# Patient Record
Sex: FEMALE | Race: WHITE
[De-identification: names, ages, dates, MRNs, and addresses within clinical notes are randomized per-mention and may not be internally consistent; named-entity substitution may affect disease eponyms.]

---

## 2019-08-30 ENCOUNTER — HOSPITAL ENCOUNTER (OUTPATIENT)
Dept: HOSPITAL 47 - RADMAMWWP | Age: 54
Discharge: HOME | End: 2019-08-30
Attending: FAMILY MEDICINE
Payer: COMMERCIAL

## 2019-08-30 DIAGNOSIS — Z12.31: Primary | ICD-10-CM

## 2019-08-30 PROCEDURE — 77067 SCR MAMMO BI INCL CAD: CPT

## 2019-09-04 NOTE — MM
Reason for exam: screening  (asymptomatic).

Last mammogram was performed 1 year and 3 months ago.



Physical Findings:

A clinical breast exam by your physician is recommended on an annual basis and 

results should be correlated with mammographic findings.



MG Screening Mammo w CAD

Bilateral CC and MLO view(s) were taken.

Prior study comparison: June 6, 2018, bilateral MG screening mammo w CAD.

The breast tissue is heterogeneously dense. This may lower the sensitivity of 

mammography.  There is chronic nodularity in the right breast.  No significant 

changes when compared with prior studies.





ASSESSMENT: Benign, BI-RAD 2



RECOMMENDATION:

Routine screening mammogram of both breasts in 1 year.

## 2020-10-12 ENCOUNTER — HOSPITAL ENCOUNTER (OUTPATIENT)
Dept: HOSPITAL 47 - RADMAMWWP | Age: 55
Discharge: HOME | End: 2020-10-12
Attending: FAMILY MEDICINE
Payer: COMMERCIAL

## 2020-10-12 DIAGNOSIS — Z12.31: Primary | ICD-10-CM

## 2020-10-12 PROCEDURE — 77067 SCR MAMMO BI INCL CAD: CPT

## 2020-10-14 NOTE — MM
Reason for exam: screening  (asymptomatic).

Last mammogram was performed 1 year and 1 month ago.



History:

Patient is postmenopausal.



Physical Findings:

A clinical breast exam by your physician is recommended on an annual basis and 

results should be correlated with mammographic findings.



MG Screening Mammo w CAD

Bilateral CC and MLO view(s) were taken.

Prior study comparison: August 30, 2019, bilateral MG screening mammo w CAD.  June 6, 2018, bilateral MG screening mammo w CAD.

The breast tissue is heterogeneously dense. This may lower the sensitivity of 

mammography.  There is no discrete abnormality.  No significant changes when 

compared with prior studies.





ASSESSMENT: Benign, BI-RAD 2



RECOMMENDATION:

Routine screening mammogram of both breasts in 1 year.

## 2021-10-22 ENCOUNTER — HOSPITAL ENCOUNTER (OUTPATIENT)
Dept: HOSPITAL 47 - RADMAMWWP | Age: 56
Discharge: HOME | End: 2021-10-22
Attending: FAMILY MEDICINE
Payer: COMMERCIAL

## 2021-10-22 DIAGNOSIS — Z12.31: Primary | ICD-10-CM

## 2021-10-22 PROCEDURE — 77067 SCR MAMMO BI INCL CAD: CPT

## 2021-10-25 NOTE — MM
Reason for exam: screening  (asymptomatic).

Last mammogram was performed 1 year ago.



History:

Patient is postmenopausal.

Took hormonal contraceptives for 25 years.



Physical Findings:

A clinical breast exam by your physician is recommended on an annual basis and 

results should be correlated with mammographic findings.



MG Screening Mammo w CAD

Bilateral CC and MLO view(s) were taken.

Prior study comparison: October 12, 2020, bilateral MG screening mammo w CAD.  

August 30, 2019, bilateral MG screening mammo w CAD.

The breast tissue is heterogeneously dense. This may lower the sensitivity of 

mammography.  There is no discrete abnormality.  No significant changes when 

compared with prior studies.





ASSESSMENT: Negative, BI-RAD 1



RECOMMENDATION:

Routine screening mammogram of both breasts in 1 year.

## 2022-05-01 ENCOUNTER — HOSPITAL ENCOUNTER (EMERGENCY)
Dept: HOSPITAL 47 - EC | Age: 57
Discharge: HOME | End: 2022-05-01
Payer: COMMERCIAL

## 2022-05-01 VITALS — SYSTOLIC BLOOD PRESSURE: 110 MMHG | DIASTOLIC BLOOD PRESSURE: 84 MMHG | HEART RATE: 98 BPM

## 2022-05-01 VITALS — RESPIRATION RATE: 18 BRPM | TEMPERATURE: 97.8 F

## 2022-05-01 DIAGNOSIS — U07.1: Primary | ICD-10-CM

## 2022-05-01 DIAGNOSIS — E83.42: ICD-10-CM

## 2022-05-01 DIAGNOSIS — R55: ICD-10-CM

## 2022-05-01 DIAGNOSIS — Z87.891: ICD-10-CM

## 2022-05-01 LAB
ALBUMIN SERPL-MCNC: 3.8 G/DL (ref 3.5–5)
ALP SERPL-CCNC: 48 U/L (ref 38–126)
ALT SERPL-CCNC: 20 U/L (ref 4–34)
ANION GAP SERPL CALC-SCNC: 9 MMOL/L
AST SERPL-CCNC: 30 U/L (ref 14–36)
BASOPHILS # BLD AUTO: 0.1 K/UL (ref 0–0.2)
BASOPHILS NFR BLD AUTO: 1 %
BUN SERPL-SCNC: 17 MG/DL (ref 7–17)
CALCIUM SPEC-MCNC: 8.4 MG/DL (ref 8.4–10.2)
CHLORIDE SERPL-SCNC: 105 MMOL/L (ref 98–107)
CO2 SERPL-SCNC: 22 MMOL/L (ref 22–30)
EOSINOPHIL # BLD AUTO: 0.1 K/UL (ref 0–0.7)
EOSINOPHIL NFR BLD AUTO: 2 %
ERYTHROCYTE [DISTWIDTH] IN BLOOD BY AUTOMATED COUNT: 4.18 M/UL (ref 3.8–5.4)
ERYTHROCYTE [DISTWIDTH] IN BLOOD: 12.6 % (ref 11.5–15.5)
GLUCOSE SERPL-MCNC: 102 MG/DL (ref 74–99)
HCT VFR BLD AUTO: 39.2 % (ref 34–46)
HGB BLD-MCNC: 12.5 GM/DL (ref 11.4–16)
HYALINE CASTS UR QL AUTO: 1 /LPF (ref 0–2)
KETONES UR QL STRIP.AUTO: (no result)
LYMPHOCYTES # SPEC AUTO: 0.5 K/UL (ref 1–4.8)
LYMPHOCYTES NFR SPEC AUTO: 9 %
MAGNESIUM SPEC-SCNC: 1.5 MG/DL (ref 1.6–2.3)
MCH RBC QN AUTO: 29.9 PG (ref 25–35)
MCHC RBC AUTO-ENTMCNC: 31.9 G/DL (ref 31–37)
MCV RBC AUTO: 93.7 FL (ref 80–100)
MONOCYTES # BLD AUTO: 0.2 K/UL (ref 0–1)
MONOCYTES NFR BLD AUTO: 4 %
NEUTROPHILS # BLD AUTO: 4.3 K/UL (ref 1.3–7.7)
NEUTROPHILS NFR BLD AUTO: 83 %
PH UR: 5.5 [PH] (ref 5–8)
PLATELET # BLD AUTO: 177 K/UL (ref 150–450)
POTASSIUM SERPL-SCNC: 4.1 MMOL/L (ref 3.5–5.1)
PROT SERPL-MCNC: 7 G/DL (ref 6.3–8.2)
RBC UR QL: 2 /HPF (ref 0–5)
SODIUM SERPL-SCNC: 136 MMOL/L (ref 137–145)
SP GR UR: 1.02 (ref 1–1.03)
SQUAMOUS UR QL AUTO: 1 /HPF (ref 0–4)
UROBILINOGEN UR QL STRIP: <2 MG/DL (ref ?–2)
WBC # BLD AUTO: 5.2 K/UL (ref 3.8–10.6)
WBC # UR AUTO: 3 /HPF (ref 0–5)

## 2022-05-01 PROCEDURE — 87502 INFLUENZA DNA AMP PROBE: CPT

## 2022-05-01 PROCEDURE — 83735 ASSAY OF MAGNESIUM: CPT

## 2022-05-01 PROCEDURE — 85379 FIBRIN DEGRADATION QUANT: CPT

## 2022-05-01 PROCEDURE — 81001 URINALYSIS AUTO W/SCOPE: CPT

## 2022-05-01 PROCEDURE — 85025 COMPLETE CBC W/AUTO DIFF WBC: CPT

## 2022-05-01 PROCEDURE — 36415 COLL VENOUS BLD VENIPUNCTURE: CPT

## 2022-05-01 PROCEDURE — 71275 CT ANGIOGRAPHY CHEST: CPT

## 2022-05-01 PROCEDURE — 80053 COMPREHEN METABOLIC PANEL: CPT

## 2022-05-01 PROCEDURE — 87635 SARS-COV-2 COVID-19 AMP PRB: CPT

## 2022-05-01 PROCEDURE — 71046 X-RAY EXAM CHEST 2 VIEWS: CPT

## 2022-05-01 PROCEDURE — 84484 ASSAY OF TROPONIN QUANT: CPT

## 2022-05-01 PROCEDURE — 93005 ELECTROCARDIOGRAM TRACING: CPT

## 2022-05-01 PROCEDURE — 99284 EMERGENCY DEPT VISIT MOD MDM: CPT

## 2022-05-01 NOTE — ED
General Adult HPI





- General


Chief complaint: Fever


Stated complaint: Fever,Nausea


Time Seen by Provider: 05/01/22 17:03


Source: patient


Mode of arrival: wheelchair


Limitations: no limitations





- History of Present Illness


Initial comments: 





This is a pleasant 57-year-old female with no significant past medical history. 

She presents to the emergency department complaining of fatigue, body aches, 

nasal congestion, mild nausea and mild headache since Friday.  Patient believes 

she also had a fever.  Patient denying any vomiting or diarrhea.  No chest pain 

or shortness of breath.  No abdominal pain.  No problems with urination.  

Patient states she's been trying to stay hydrated.  Also complaining of a cough 

and mild shortness of breath





 no changes in vision or hearing, no sore throat or difficulty with speech, no 

neck pain, no chest pain, no abdominal pain, no vomiting, no changes in 

urination or bowel movements, no numbness or tingling, no extremity pain, no 

skin rashes or lesions.





Of note, patient states that she also passed out earlier today.   thought

he heard something from the room and found her leg [facility] on the bed and on the

floor.  Patient states she had went to the bathroom was 20 back to the room b

ecame lightheaded.  Patient unsure whether she completely lost consciousness.  

She denies any injury during this fall.  No head or neck pain.  No vertigo.  No 

focal weakness.





- Related Data


                                Home Medications











 Medication  Instructions  Recorded  Confirmed


 


Aspirin [Adult Low Dose Aspirin EC] 81 mg PO DAILY 05/31/18 05/31/18


 


Norgestimate-Ethinyl Estradiol 1 each PO DAILY 05/31/18 05/31/18





[Sprintec 28 Day Tablet]   








                                  Previous Rx's











 Medication  Instructions  Recorded


 


Acetaminophen [Tylenol] 500 mg PO Q4-6H PRN #24 tab 05/01/22


 


Ibuprofen [Motrin Ib] 400 mg PO Q8H PRN #50 tab 05/01/22


 


Magnesium Oxide 400 mg PO DAILY #10 tab 05/01/22











                                    Allergies











Allergy/AdvReac Type Severity Reaction Status Date / Time


 


No Known Allergies Allergy   Verified 05/01/22 16:30














Review of Systems


ROS Statement: 


Those systems with pertinent positive or pertinent negative responses have been 

documented in the HPI.





ROS Other: All systems not noted in ROS Statement are negative.





Past Medical History


Past Medical History: No Reported History


Additional Past Medical History / Comment(s): SCREENING


History of Any Multi-Drug Resistant Organisms: None Reported


Past Surgical History: No Surgical Hx Reported


Past Anesthesia/Blood Transfusion Reactions: Motion Sickness


Additional Past Anesthesia/Blood Transfusion Reaction / Comment(s): NO PRIOR SX 

HX


Past Psychological History: No Psychological Hx Reported


Smoking Status: Former smoker


Past Alcohol Use History: None Reported


Past Drug Use History: None Reported





- Past Family History


  ** Father


Family Medical History: Cancer





General Exam





- General Exam Comments


Initial Comments: 





She appears mildly ill but not toxic.  Cranial nerves II through XII are intact.


Limitations: no limitations


General appearance: alert, in no apparent distress


Head exam: Present: atraumatic, normocephalic, normal inspection


Eye exam: Present: normal appearance, PERRL, EOMI.  Absent: scleral icterus, 

conjunctival injection, periorbital swelling


ENT exam: Present: normal exam, normal oropharynx, mucous membranes moist, TM's 

normal bilaterally, normal external ear exam.  Absent: mucous membranes dry


Neck exam: Present: normal inspection, full ROM.  Absent: tenderness, 

meningismus, lymphadenopathy


Respiratory exam: Present: normal lung sounds bilaterally.  Absent: respiratory 

distress, wheezes, rales, rhonchi, stridor, chest wall tenderness, accessory 

muscle use, decreased breath sounds, prolonged expiratory


Cardiovascular Exam: Present: regular rate, normal rhythm, normal heart sounds. 

Absent: systolic murmur, diastolic murmur, rubs, gallop, clicks


GI/Abdominal exam: Present: soft, normal bowel sounds.  Absent: distended, 

tenderness, guarding, rebound, rigid


Extremities exam: Present: normal inspection, full ROM, normal capillary refill.

 Absent: tenderness, pedal edema, joint swelling, calf tenderness


Back exam: Present: normal inspection


Neurological exam: Present: alert, oriented X3, CN II-XII intact, normal gait.  

Absent: altered, abnormal gait


Psychiatric exam: Present: normal affect, normal mood.  Absent: depressed, 

agitated, anxious, flat affect, manic, homicidal ideation


Skin exam: Present: warm, dry, intact, normal color.  Absent: rash, cyanosis, 

diaphoretic, erythema, urticaria, vesicles





Course


                                   Vital Signs











  05/01/22





  16:26


 


Temperature 97.8 F


 


Pulse Rate 70


 


Respiratory 18





Rate 


 


Blood Pressure 126/74


 


O2 Sat by Pulse 99





Oximetry 














- Reevaluation(s)


Reevaluation #1: 





05/01/22 21:06


Patient reevaluated several times with a course of stay.  Patient much improved 

after treatment here in the ER.  COVID-19 positive.  Patient remained h

emodynamically stable.





EKG Findings





- EKG Comments:


EKG Findings:: EKG done at 1817 and read by the ED attending physician reveals 

sinus rhythm with the rate is 69.  Normal intervals.  Evidence of an incomplete 

right bundle-branch block with RSR pattern.  No evidence of acute changes.  

Normal axis.





Medical Decision Making





- Medical Decision Making





Patient has shortness of breath, had a syncopal episode or near syncopal episode

at home.  PERC is 1





Patient's [place] syncope score is very low based on her central pathology.  

This sounds like it was more of a near syncopal episode.  There was no seizure 

activity.  Patient positive for COVID-19.





Quarantine measures discussed in detail.  Treatment plan discussed.  Patient 

does not meet criteria for monoclonal antibody.  Antipyretic therapy discussed 

hydration strategies discussed.





Minimally low magnesium.  Supplemented here.  We'll supplement for an additional

5 days.





Patient was told to return to the ER for any signs or symptoms worsen.  Told to 

return immediately if any other problems arise.  All questions answered.  

Treatment plan discussed.  Patient in agreement


Every effort has been made to ensure accuracy of this dictation.  However, due 

to the limitations of electronic medical records and dictation devices, errors 

in charting still occur.





Supervising physician is Dr. Malave





- Lab Data


Result diagrams: 


                                 05/01/22 20:27





                                 05/01/22 20:27


                                   Lab Results











  05/01/22 05/01/22 05/01/22 Range/Units





  16:38 16:38 19:03 


 


WBC     (3.8-10.6)  k/uL


 


RBC     (3.80-5.40)  m/uL


 


Hgb     (11.4-16.0)  gm/dL


 


Hct     (34.0-46.0)  %


 


MCV     (80.0-100.0)  fL


 


MCH     (25.0-35.0)  pg


 


MCHC     (31.0-37.0)  g/dL


 


RDW     (11.5-15.5)  %


 


Plt Count     (150-450)  k/uL


 


MPV     


 


Neutrophils %     %


 


Lymphocytes %     %


 


Monocytes %     %


 


Eosinophils %     %


 


Basophils %     %


 


Neutrophils #     (1.3-7.7)  k/uL


 


Lymphocytes #     (1.0-4.8)  k/uL


 


Monocytes #     (0-1.0)  k/uL


 


Eosinophils #     (0-0.7)  k/uL


 


Basophils #     (0-0.2)  k/uL


 


Sodium     (137-145)  mmol/L


 


Potassium     (3.5-5.1)  mmol/L


 


Chloride     ()  mmol/L


 


Carbon Dioxide     (22-30)  mmol/L


 


Anion Gap     mmol/L


 


BUN     (7-17)  mg/dL


 


Creatinine     (0.52-1.04)  mg/dL


 


Est GFR (CKD-EPI)AfAm     (>60 ml/min/1.73 sqM)  


 


Est GFR (CKD-EPI)NonAf     (>60 ml/min/1.73 sqM)  


 


Glucose     (74-99)  mg/dL


 


Calcium     (8.4-10.2)  mg/dL


 


Magnesium     (1.6-2.3)  mg/dL


 


Total Bilirubin     (0.2-1.3)  mg/dL


 


AST     (14-36)  U/L


 


ALT     (4-34)  U/L


 


Alkaline Phosphatase     ()  U/L


 


Total Protein     (6.3-8.2)  g/dL


 


Albumin     (3.5-5.0)  g/dL


 


Urine Color    Yellow  


 


Urine Appearance    Cloudy H  (Clear)  


 


Urine pH    5.5  (5.0-8.0)  


 


Ur Specific Gravity    1.023  (1.001-1.035)  


 


Urine Protein    Trace H  (Negative)  


 


Urine Glucose (UA)    Negative  (Negative)  


 


Urine Ketones    1+ H  (Negative)  


 


Urine Blood    Negative  (Negative)  


 


Urine Nitrite    Negative  (Negative)  


 


Urine Bilirubin    Negative  (Negative)  


 


Urine Urobilinogen    <2.0  (<2.0)  mg/dL


 


Ur Leukocyte Esterase    Negative  (Negative)  


 


Urine RBC    2  (0-5)  /hpf


 


Urine WBC    3  (0-5)  /hpf


 


Ur Squamous Epith Cells    1  (0-4)  /hpf


 


Amorphous Sediment    Rare H  (None)  /hpf


 


Urine Bacteria    Rare H  (None)  /hpf


 


Hyaline Casts    1  (0-2)  /lpf


 


Urine Mucus    Moderate H  (None)  /hpf


 


Coronavirus (PCR)   Detected A   (Not Detectd)  


 


Influenza Type A RNA  Not Detected    (Not Detectd)  


 


Influenza Type B (PCR)  Not Detected    (Not Detectd)  














  05/01/22 05/01/22 Range/Units





  20:27 20:27 


 


WBC  5.2   (3.8-10.6)  k/uL


 


RBC  4.18   (3.80-5.40)  m/uL


 


Hgb  12.5   (11.4-16.0)  gm/dL


 


Hct  39.2   (34.0-46.0)  %


 


MCV  93.7   (80.0-100.0)  fL


 


MCH  29.9   (25.0-35.0)  pg


 


MCHC  31.9   (31.0-37.0)  g/dL


 


RDW  12.6   (11.5-15.5)  %


 


Plt Count  177   (150-450)  k/uL


 


MPV  8.1   


 


Neutrophils %  83   %


 


Lymphocytes %  9   %


 


Monocytes %  4   %


 


Eosinophils %  2   %


 


Basophils %  1   %


 


Neutrophils #  4.3   (1.3-7.7)  k/uL


 


Lymphocytes #  0.5 L   (1.0-4.8)  k/uL


 


Monocytes #  0.2   (0-1.0)  k/uL


 


Eosinophils #  0.1   (0-0.7)  k/uL


 


Basophils #  0.1   (0-0.2)  k/uL


 


Sodium   136 L  (137-145)  mmol/L


 


Potassium   4.1  (3.5-5.1)  mmol/L


 


Chloride   105  ()  mmol/L


 


Carbon Dioxide   22  (22-30)  mmol/L


 


Anion Gap   9  mmol/L


 


BUN   17  (7-17)  mg/dL


 


Creatinine   0.68  (0.52-1.04)  mg/dL


 


Est GFR (CKD-EPI)AfAm   >90  (>60 ml/min/1.73 sqM)  


 


Est GFR (CKD-EPI)NonAf   >90  (>60 ml/min/1.73 sqM)  


 


Glucose   102 H  (74-99)  mg/dL


 


Calcium   8.4  (8.4-10.2)  mg/dL


 


Magnesium   1.5 L  (1.6-2.3)  mg/dL


 


Total Bilirubin   0.3  (0.2-1.3)  mg/dL


 


AST   30  (14-36)  U/L


 


ALT   20  (4-34)  U/L


 


Alkaline Phosphatase   48  ()  U/L


 


Total Protein   7.0  (6.3-8.2)  g/dL


 


Albumin   3.8  (3.5-5.0)  g/dL


 


Urine Color    


 


Urine Appearance    (Clear)  


 


Urine pH    (5.0-8.0)  


 


Ur Specific Gravity    (1.001-1.035)  


 


Urine Protein    (Negative)  


 


Urine Glucose (UA)    (Negative)  


 


Urine Ketones    (Negative)  


 


Urine Blood    (Negative)  


 


Urine Nitrite    (Negative)  


 


Urine Bilirubin    (Negative)  


 


Urine Urobilinogen    (<2.0)  mg/dL


 


Ur Leukocyte Esterase    (Negative)  


 


Urine RBC    (0-5)  /hpf


 


Urine WBC    (0-5)  /hpf


 


Ur Squamous Epith Cells    (0-4)  /hpf


 


Amorphous Sediment    (None)  /hpf


 


Urine Bacteria    (None)  /hpf


 


Hyaline Casts    (0-2)  /lpf


 


Urine Mucus    (None)  /hpf


 


Coronavirus (PCR)    (Not Detectd)  


 


Influenza Type A RNA    (Not Detectd)  


 


Influenza Type B (PCR)    (Not Detectd)  














Disposition


Clinical Impression: 


 COVID-19, Near syncope, Hypomagnesemia





Disposition: HOME SELF-CARE


Condition: Good


Instructions (If sedation given, give patient instructions):  COVID-19 

(Coronavirus Disease 2019) (ED), Hypomagnesemia (ED)


Additional Instructions: 


SELF QUARANTINE DISCHARGE:


 As you are at risk for symptoms due to coronavirus, please stay home and stay 

away from others as much as possible.  Please maintain social distance of 6 feet

if possible.


 You should not return to work until at least 3 days (72 hours) have passed 

since recovery of symptoms.  This defined as resolution of fever without the use

of fever reducing medicines and improvement in respiratory symptoms (e.g,, 

cough, shortness of breath)


Isolation can end at least 5 days after symptom onset and after fever ends for 

24 hours (without the use of fever-reducing medication) and symptoms are 

improving, if these people can continue to properly wear a well-fitted mask 

around others for 5 more days after the 5-day isolation period.


If you're still having symptoms at the end of 5 day period, isolate for an 

additional 5 days.





  More information about what to do if you are sick can be found on the CDC 

website at https://www.cd

c.gov/coronavirus/2019-ncov/if-you-are-sick/steps-when-sick.html


 Expect the symptoms to last for 7-14 days from onset.


 Use acetaminophen (Tylenol) as needed for discomfort.  You can take a maximum 

of 1 gram every 6 hours for discomfort, with your total dose in 24 hours not 

exceeding 4 grams.


 Be sure to maintain hydration.  Drink continuous water and/or items high in 

vitamin C, such as orange juice and/or lemonade.


 Unless you have high blood pressure, you may consider Sudafed (which is ov

er-the-counter) for nasal congestion.  I would suggest that a short acting 

Sudafed rather than the 24 hour Sudafed.


 For a cough you may take Mucinex or Robitussin. Also consider the use of 

Vicks Vapor Rub or your chest when you sleep.


 Use a humidifier that is cleaned frequently, in the bedroom at night.


For Nausea /Vomiting/Diarrhea associated with your Illness:


     o Small frequent sips of room temperature liquids.


     o Diet: [employer] -  If you are still experiencing discomfort and/or 

nausea please slowly advancing your diet using the BRAT Diet = bananas, rice, 

apples/apple sauce, toast.


     o With diarrhea avoid any dairy for 48 hours after symptoms resolved.


     o Continue with activity as tolerated.


 If your symptoms do get worse and you believe that the upper respiratory 

infection has developed into something else, such as pneumonia or severe 

dehydration, please return to the emergency department or follow-up with your 

primary care.  But expect to be symptomatic for the days as indicated above














Your magnesium was slightly low here.  We will supplement this as well.


Is patient prescribed a controlled substance at d/c from ED?: No


Referrals: 


Khris Trevizo MD [Primary Care Provider] - 05/06/22


Time of Disposition: 21:08

## 2022-05-01 NOTE — XR
EXAMINATION TYPE: XR chest 2V

 

DATE OF EXAM: 5/1/2022

 

COMPARISON: NONE

 

HISTORY: Cough and fever

 

TECHNIQUE:  Frontal and lateral views of the chest are obtained.

 

FINDINGS:  There is no focal air space opacity, pleural effusion, or pneumothorax seen.  The cardiac 
silhouette size is within normal limits.   The osseous structures are intact.

 

IMPRESSION:  No acute cardiopulmonary process.

## 2022-07-14 ENCOUNTER — HOSPITAL ENCOUNTER (EMERGENCY)
Dept: HOSPITAL 47 - EC | Age: 57
Discharge: HOME | End: 2022-07-14
Payer: COMMERCIAL

## 2022-07-14 VITALS
SYSTOLIC BLOOD PRESSURE: 141 MMHG | HEART RATE: 83 BPM | DIASTOLIC BLOOD PRESSURE: 77 MMHG | TEMPERATURE: 97.7 F | RESPIRATION RATE: 16 BRPM

## 2022-07-14 DIAGNOSIS — Z87.891: ICD-10-CM

## 2022-07-14 DIAGNOSIS — Z02.89: Primary | ICD-10-CM

## 2022-07-14 PROCEDURE — 99499 UNLISTED E&M SERVICE: CPT

## 2022-07-14 NOTE — ED
General Adult HPI





- General


Chief complaint: Recheck/Abnormal Lab/Rx


Stated complaint: [facility] testing


Time Seen by Provider: 07/14/22 03:38


Source: patient


Mode of arrival: ambulatory





- History of Present Illness


Initial comments: 





This patient is a 57-year-old woman here just to have [facility] drug screen.  She 

declines injury and declines screening exam





- Related Data


                                Home Medications











 Medication  Instructions  Recorded  Confirmed


 


Aspirin [Adult Low Dose Aspirin EC] 81 mg PO DAILY 05/31/18 05/31/18


 


norgestimate-ethinyl estradioL 1 each PO DAILY 05/31/18 05/31/18





[Sprintec 28 Day Tablet]   








                                  Previous Rx's











 Medication  Instructions  Recorded


 


Acetaminophen [Tylenol] 500 mg PO Q4-6H PRN #24 tab 05/01/22


 


Ibuprofen [Motrin Ib] 400 mg PO Q8H PRN #50 tab 05/01/22


 


Magnesium Oxide 400 mg PO DAILY #10 tab 05/01/22











                                    Allergies











Allergy/AdvReac Type Severity Reaction Status Date / Time


 


No Known Allergies Allergy   Verified 07/14/22 03:32














Review of Systems


ROS Statement: 


Those systems with pertinent positive or pertinent negative responses have been 

documented in the HPI.





ROS Other: All systems not noted in ROS Statement are negative.





Past Medical History


Past Medical History: No Reported History


Additional Past Medical History / Comment(s): SCREENING


History of Any Multi-Drug Resistant Organisms: None Reported


Past Surgical History: No Surgical Hx Reported


Past Anesthesia/Blood Transfusion Reactions: Motion Sickness


Additional Past Anesthesia/Blood Transfusion Reaction / Comment(s): NO PRIOR SX 

HX


Past Psychological History: No Psychological Hx Reported


Smoking Status: Former smoker


Past Alcohol Use History: None Reported


Past Drug Use History: None Reported





- Past Family History


  ** Father


Family Medical History: Cancer





Course


                                   Vital Signs











  07/14/22





  03:26


 


Temperature 97.7 F


 


Pulse Rate 83


 


Respiratory 16





Rate 


 


Blood Pressure 141/77


 


O2 Sat by Pulse 98





Oximetry 














Disposition


Clinical Impression: 


 Encounter for drug screening





Disposition: HOME SELF-CARE


Condition: Good


Is patient prescribed a controlled substance at d/c from ED?: No


Referrals: 


Khris Trevizo MD [Primary Care Provider] - 1-2 days

## 2022-10-28 ENCOUNTER — HOSPITAL ENCOUNTER (OUTPATIENT)
Dept: HOSPITAL 47 - RADMAMWWP | Age: 57
Discharge: HOME | End: 2022-10-28
Attending: FAMILY MEDICINE
Payer: COMMERCIAL

## 2022-10-28 DIAGNOSIS — Z78.0: ICD-10-CM

## 2022-10-28 DIAGNOSIS — Z12.31: Primary | ICD-10-CM

## 2022-10-28 PROCEDURE — 77067 SCR MAMMO BI INCL CAD: CPT

## 2022-10-31 NOTE — MM
Reason for Exam: Screening  (asymptomatic). 

Last screening mammogram was performed 12 month(s) ago.





Patient History: 

Menarche at age 18. First Full-Term Pregnancy at age 25. Postmenopausal. Patient used Hormonal

Contraceptives for 25 years. 





Risk Values: 

Melly 5 year model risk: 1.0%.

NCI Lifetime model risk: 6.7%.





Prior Study Comparison: 

8/30/2019 Bilateral Screening Mammogram, [facility]. 10/12/2020 Bilateral Screening Mammogram, [facility].

10/22/2021 Bilateral Screening Mammogram, [facility]. 





Tissue Density: 

The breast tissue is heterogeneously dense. This may lower the sensitivity of mammography.





Findings: 

Analyzed By CAD. 

Some chronic nodularity is present. There appear to be new nodules within the bilateral breasts. On

the left this measures 0.7 cm in the upper outer aspect left breast 8 cm from the nipple. On the

right this is in the upper outer aspect posterior position measuring 0.5 cm located 9 cm from

nipple. Additional workup with ultrasound is recommended. 





Overall Assessment: Incomplete: need additional imaging evaluation, BI-RAD 0





Management: 

Diagnostic Breast Ultrasound of both breasts.

A negative mammogram report should not preclude additional follow up of suspicious palpable

abnormalities.

Patient should continue monthly self breast exam.

A clinical breast exam by your physician is recommended on an annual basis and results should be

correlated with mammographic findings.



Electronically signed and approved by: Wilder Estevez D.O. Radiologis

## 2025-05-09 ENCOUNTER — HOSPITAL ENCOUNTER (EMERGENCY)
Dept: HOSPITAL 47 - EC | Age: 60
Discharge: HOME | End: 2025-05-09
Payer: COMMERCIAL

## 2025-05-09 VITALS — DIASTOLIC BLOOD PRESSURE: 81 MMHG | RESPIRATION RATE: 18 BRPM | SYSTOLIC BLOOD PRESSURE: 127 MMHG

## 2025-05-09 VITALS — TEMPERATURE: 97.9 F

## 2025-05-09 VITALS — HEART RATE: 90 BPM

## 2025-05-09 DIAGNOSIS — J44.9: Primary | ICD-10-CM

## 2025-05-09 DIAGNOSIS — Z87.891: ICD-10-CM

## 2025-05-09 DIAGNOSIS — I45.10: ICD-10-CM

## 2025-05-09 DIAGNOSIS — J40: ICD-10-CM

## 2025-05-09 LAB
ALBUMIN SERPL-MCNC: 4.6 G/DL (ref 3.5–5)
ALP SERPL-CCNC: 55 U/L (ref 38–126)
ALT SERPL-CCNC: 25 U/L (ref 4–34)
ANION GAP SERPL CALC-SCNC: 13 MMOL/L
AST SERPL-CCNC: 49 U/L (ref 14–36)
BUN SERPL-SCNC: 11 MG/DL (ref 7–17)
CELLS COUNTED: 100
CHLORIDE SERPL-SCNC: 101 MMOL/L (ref 98–107)
CO2 SERPL-SCNC: 20 MMOL/L (ref 22–30)
ERYTHROCYTE [DISTWIDTH] IN BLOOD BY AUTOMATED COUNT: 4.22 10*6/UL (ref 4.1–5.2)
ERYTHROCYTE [DISTWIDTH] IN BLOOD: 12.7 % (ref 11.5–14.5)
GLUCOSE SERPL-MCNC: 113 MG/DL (ref 74–99)
HCT VFR BLD AUTO: 37.2 % (ref 37.2–46.3)
HGB BLD-MCNC: 12.8 G/DL (ref 12–15)
LYMPHOCYTES # BLD MANUAL: 0.96 K/UL (ref 1–4.8)
MAGNESIUM SPEC-SCNC: 1.8 MG/DL (ref 1.6–2.3)
MCH RBC QN AUTO: 30.3 PG (ref 27–32)
MCHC RBC AUTO-ENTMCNC: 34.4 G/DL (ref 32–37)
MCV RBC AUTO: 88.2 FL (ref 80–97)
MONOCYTES # BLD MANUAL: 0.44 K/UL (ref 0–1)
NEUTROPHILS NFR BLD MANUAL: 79 %
NEUTS SEG # BLD MANUAL: 5.97 K/UL (ref 1.3–7.7)
PLATELET # BLD AUTO: 173 10*3/UL (ref 140–440)
POTASSIUM SERPL-SCNC: 4.2 MMOL/L (ref 3.5–5.1)
PROT SERPL-MCNC: 8.6 G/DL (ref 6.3–8.2)
SODIUM SERPL-SCNC: 134 MMOL/L (ref 137–145)
WBC # BLD AUTO: 7.38 10*3/UL (ref 4.5–10)

## 2025-05-09 PROCEDURE — 99285 EMERGENCY DEPT VISIT HI MDM: CPT

## 2025-05-09 PROCEDURE — 96375 TX/PRO/DX INJ NEW DRUG ADDON: CPT

## 2025-05-09 PROCEDURE — 71046 X-RAY EXAM CHEST 2 VIEWS: CPT

## 2025-05-09 PROCEDURE — 94640 AIRWAY INHALATION TREATMENT: CPT

## 2025-05-09 PROCEDURE — 80053 COMPREHEN METABOLIC PANEL: CPT

## 2025-05-09 PROCEDURE — 83605 ASSAY OF LACTIC ACID: CPT

## 2025-05-09 PROCEDURE — 83735 ASSAY OF MAGNESIUM: CPT

## 2025-05-09 PROCEDURE — 85025 COMPLETE CBC W/AUTO DIFF WBC: CPT

## 2025-05-09 PROCEDURE — 96365 THER/PROPH/DIAG IV INF INIT: CPT

## 2025-05-09 PROCEDURE — 93005 ELECTROCARDIOGRAM TRACING: CPT

## 2025-05-09 PROCEDURE — 87636 SARSCOV2 & INF A&B AMP PRB: CPT

## 2025-05-09 PROCEDURE — 36415 COLL VENOUS BLD VENIPUNCTURE: CPT

## 2025-05-09 RX ADMIN — IPRATROPIUM BROMIDE AND ALBUTEROL SULFATE STA ML: .5; 3 SOLUTION RESPIRATORY (INHALATION) at 13:30

## 2025-05-09 RX ADMIN — LORAZEPAM STA MG: 2 INJECTION INTRAMUSCULAR; INTRAVENOUS at 12:18

## 2025-05-09 RX ADMIN — MAGNESIUM SULFATE IN DEXTROSE STA MLS/HR: 10 INJECTION, SOLUTION INTRAVENOUS at 12:19

## 2025-05-09 RX ADMIN — AZITHROMYCIN STA MG: 500 TABLET, FILM COATED ORAL at 14:54

## 2025-05-09 RX ADMIN — POTASSIUM CHLORIDE ONE MLS/HR: 14.9 INJECTION, SOLUTION INTRAVENOUS at 12:19

## 2025-06-09 ENCOUNTER — HOSPITAL ENCOUNTER (OUTPATIENT)
Dept: HOSPITAL 47 - RADMAMWWP | Age: 60
Discharge: HOME | End: 2025-06-09
Attending: FAMILY MEDICINE
Payer: COMMERCIAL

## 2025-06-09 DIAGNOSIS — Z12.31: Primary | ICD-10-CM

## 2025-06-09 DIAGNOSIS — Z78.0: ICD-10-CM

## 2025-06-09 DIAGNOSIS — R92.323: ICD-10-CM

## 2025-06-09 DIAGNOSIS — Z92.0: ICD-10-CM

## 2025-06-09 PROCEDURE — 77067 SCR MAMMO BI INCL CAD: CPT
